# Patient Record
Sex: FEMALE | Race: WHITE | Employment: UNEMPLOYED | URBAN - METROPOLITAN AREA
[De-identification: names, ages, dates, MRNs, and addresses within clinical notes are randomized per-mention and may not be internally consistent; named-entity substitution may affect disease eponyms.]

---

## 2017-04-07 ENCOUNTER — OFFICE VISIT (OUTPATIENT)
Dept: INTERNAL MEDICINE | Facility: CLINIC | Age: 57
End: 2017-04-07

## 2017-04-07 VITALS
SYSTOLIC BLOOD PRESSURE: 132 MMHG | WEIGHT: 185 LBS | BODY MASS INDEX: 31.03 KG/M2 | HEART RATE: 71 BPM | DIASTOLIC BLOOD PRESSURE: 78 MMHG | TEMPERATURE: 97.9 F | RESPIRATION RATE: 16 BRPM | OXYGEN SATURATION: 96 %

## 2017-04-07 DIAGNOSIS — M25.572 ACUTE LEFT ANKLE PAIN: Primary | ICD-10-CM

## 2017-04-07 DIAGNOSIS — I10 ESSENTIAL HYPERTENSION: ICD-10-CM

## 2017-04-07 PROCEDURE — 99213 OFFICE O/P EST LOW 20 MIN: CPT | Performed by: PHYSICIAN ASSISTANT

## 2017-04-07 NOTE — PROGRESS NOTES
Subjective   Nisha Echavarria is a 56 y.o. female.   Chief Complaint   Patient presents with   • Hypertension     f/u   • Ankle Pain     History of Present Illness   PT here for HTN f/u.  Denies symptoms, and looks controlled today.    Left ankle is painful x 2 months.  It is intermittent and localized to medial aspect malleolus, yesterday she was limping.  No hx of trauma.  Denies redness or swelling.    The following portions of the patient's history were reviewed and updated as appropriate: allergies, current medications and problem list.    Review of Systems   Eyes: Negative for visual disturbance.   Musculoskeletal: Positive for gait problem. Negative for back pain.   Neurological: Negative for headaches.   Psychiatric/Behavioral: Negative for sleep disturbance.       Objective   Physical Exam   Constitutional: She appears well-developed and well-nourished.   HENT:   Head: Normocephalic and atraumatic.   Right Ear: External ear normal.   Left Ear: External ear normal.   Eyes: Conjunctivae are normal.   Cardiovascular: Normal rate, regular rhythm and normal heart sounds.  Exam reveals no gallop and no friction rub.    No murmur heard.  Pulmonary/Chest: Effort normal and breath sounds normal.   Musculoskeletal:        Left ankle: She exhibits abnormal pulse. She exhibits normal range of motion, no swelling and no ecchymosis. Tenderness. Medial malleolus tenderness found. No lateral malleolus tenderness found. Achilles tendon exhibits no pain.   Psychiatric: She has a normal mood and affect.   Vitals reviewed.      Assessment/Plan   Nisha was seen today for hypertension and ankle pain.    Diagnoses and all orders for this visit:    Acute left ankle pain    Essential hypertension      Recommend RICE and tylenol.  F/u if not improving in 1 more month.  BP well controlled.

## 2017-04-07 NOTE — PATIENT INSTRUCTIONS
For ankle, recommend rest, ice, elevation and compression with ace bandage.  Can use heat if feel better.  Take 1000mg of tylenol as needed.  If persisting, then recommend xray and possible physical therapy.

## 2017-12-06 RX ORDER — SCOLOPAMINE TRANSDERMAL SYSTEM 1 MG/1
1 PATCH, EXTENDED RELEASE TRANSDERMAL
Qty: 4 PATCH | Refills: 0 | Status: SHIPPED | OUTPATIENT
Start: 2017-12-06